# Patient Record
Sex: MALE | Race: WHITE | URBAN - METROPOLITAN AREA
[De-identification: names, ages, dates, MRNs, and addresses within clinical notes are randomized per-mention and may not be internally consistent; named-entity substitution may affect disease eponyms.]

---

## 2019-09-21 ENCOUNTER — OFFICE VISIT (OUTPATIENT)
Dept: ORTHOPEDIC SURGERY | Age: 54
End: 2019-09-21
Payer: COMMERCIAL

## 2019-09-21 VITALS
HEART RATE: 69 BPM | WEIGHT: 198 LBS | SYSTOLIC BLOOD PRESSURE: 126 MMHG | BODY MASS INDEX: 27.72 KG/M2 | DIASTOLIC BLOOD PRESSURE: 86 MMHG | HEIGHT: 71 IN

## 2019-09-21 DIAGNOSIS — S90.32XA CONTUSION OF LEFT FOOT, INITIAL ENCOUNTER: ICD-10-CM

## 2019-09-21 DIAGNOSIS — M79.672 LEFT FOOT PAIN: Primary | ICD-10-CM

## 2019-09-21 PROCEDURE — 99203 OFFICE O/P NEW LOW 30 MIN: CPT | Performed by: NURSE PRACTITIONER

## 2019-09-21 RX ORDER — MELOXICAM 7.5 MG/1
7.5 TABLET ORAL DAILY
Qty: 30 TABLET | Refills: 0 | Status: SHIPPED | OUTPATIENT
Start: 2019-09-21 | End: 2020-02-17

## 2020-02-17 ENCOUNTER — OFFICE VISIT (OUTPATIENT)
Dept: ORTHOPEDIC SURGERY | Age: 55
End: 2020-02-17
Payer: COMMERCIAL

## 2020-02-17 VITALS — HEIGHT: 71 IN | BODY MASS INDEX: 27.72 KG/M2 | WEIGHT: 197.97 LBS

## 2020-02-17 PROCEDURE — 99213 OFFICE O/P EST LOW 20 MIN: CPT | Performed by: PHYSICIAN ASSISTANT

## 2020-02-17 RX ORDER — NAPROXEN 250 MG/1
250 TABLET ORAL 2 TIMES DAILY WITH MEALS
COMMUNITY

## 2020-02-17 RX ORDER — IBUPROFEN 200 MG
200 TABLET ORAL EVERY 6 HOURS PRN
COMMUNITY

## 2020-02-20 NOTE — PROGRESS NOTES
Chief Complaint    Ankle Pain (RIGHT lateral ankle pain incrdeased yesterday with no specific injury; has been taking ibuprofen/naproxen and rest helping)      History of Present Illness:  Chula Chandra is a 50 y.o. male who comes in today for evaluation and treatment of right posterior lateral ankle pain. The patient reports an onset of pain that developed in his ankle with no specific injury or trauma. 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 He reports that he had a lot of pain with prolonged periods of standing and walking. He came home last evening and took some ibuprofen and iced it but reports the symptoms still persist but are better than what they were. Pain Assessment  Location of Pain: Ankle  Location Modifiers: Right, Lateral, Inferior  Severity of Pain: 4(currently; at worst 10/10 with walking)  Quality of Pain: Sharp  Duration of Pain: Persistent  Frequency of Pain: Constant  Aggravating Factors: Walking, Standing, Bending, Straightening  Limiting Behavior: Some  Relieving Factors: Rest, Nsaids  Result of Injury: No]    Medical History:  History reviewed. No pertinent past medical history. There are no active problems to display for this patient. Current Outpatient Medications   Medication Sig Dispense Refill    ibuprofen (ADVIL;MOTRIN) 200 MG tablet Take 200 mg by mouth every 6 hours as needed for Pain      naproxen (NAPROSYN) 250 MG tablet Take 250 mg by mouth 2 times daily (with meals)       No current facility-administered medications for this visit. Review of Systems:  Relevant review of systems reviewed and available in the patient's chart      Vital Signs:  Ht 5' 10.98\" (1.803 m)   Wt 197 lb 15.6 oz (89.8 kg)   BMI 27.62 kg/m²     General Exam:   Constitutional: Patient is adequately groomed with no evidence of malnutrition  DTRs: Deep tendon reflexes are intact  Mental Status:  The patient is oriented to time, place and

## 2022-11-11 ENCOUNTER — HOSPITAL ENCOUNTER (OUTPATIENT)
Dept: GENERAL RADIOLOGY | Age: 57
Discharge: HOME | End: 2022-11-11
Payer: COMMERCIAL

## 2022-11-11 ENCOUNTER — HOSPITAL ENCOUNTER (OUTPATIENT)
Age: 57
Discharge: HOME | End: 2022-11-11
Payer: COMMERCIAL

## 2022-11-11 DIAGNOSIS — R52 PAIN: ICD-10-CM

## 2022-11-11 PROCEDURE — 73630 X-RAY EXAM OF FOOT: CPT
